# Patient Record
Sex: MALE | Race: WHITE | Employment: OTHER | URBAN - METROPOLITAN AREA
[De-identification: names, ages, dates, MRNs, and addresses within clinical notes are randomized per-mention and may not be internally consistent; named-entity substitution may affect disease eponyms.]

---

## 2024-01-22 ENCOUNTER — INITIAL CONSULT (OUTPATIENT)
Age: 64
End: 2024-01-22

## 2024-01-22 VITALS
DIASTOLIC BLOOD PRESSURE: 75 MMHG | OXYGEN SATURATION: 98 % | SYSTOLIC BLOOD PRESSURE: 131 MMHG | HEART RATE: 52 BPM | BODY MASS INDEX: 28.91 KG/M2 | TEMPERATURE: 98 F | WEIGHT: 173.5 LBS | HEIGHT: 65 IN

## 2024-01-22 DIAGNOSIS — E78.5 HYPERLIPIDEMIA, UNSPECIFIED HYPERLIPIDEMIA TYPE: ICD-10-CM

## 2024-01-22 DIAGNOSIS — I25.10 CAD S/P PERCUTANEOUS CORONARY ANGIOPLASTY: Primary | ICD-10-CM

## 2024-01-22 DIAGNOSIS — I73.9 PVD (PERIPHERAL VASCULAR DISEASE) (HCC): ICD-10-CM

## 2024-01-22 DIAGNOSIS — Z98.61 CAD S/P PERCUTANEOUS CORONARY ANGIOPLASTY: Primary | ICD-10-CM

## 2024-01-22 DIAGNOSIS — Z87.891 FORMER SMOKER: ICD-10-CM

## 2024-01-22 DIAGNOSIS — E11.69 TYPE 2 DIABETES MELLITUS WITH OTHER SPECIFIED COMPLICATION, WITHOUT LONG-TERM CURRENT USE OF INSULIN (HCC): ICD-10-CM

## 2024-01-22 DIAGNOSIS — I10 ESSENTIAL HYPERTENSION: ICD-10-CM

## 2024-01-22 DIAGNOSIS — I25.5 ISCHEMIC CARDIOMYOPATHY: ICD-10-CM

## 2024-01-22 DIAGNOSIS — Z95.1 S/P CABG (CORONARY ARTERY BYPASS GRAFT): ICD-10-CM

## 2024-01-22 DIAGNOSIS — I25.118 CORONARY ARTERY DISEASE OF NATIVE HEART WITH STABLE ANGINA PECTORIS, UNSPECIFIED VESSEL OR LESION TYPE (HCC): ICD-10-CM

## 2024-01-22 PROBLEM — E11.9 TYPE 2 DIABETES MELLITUS (HCC): Status: ACTIVE | Noted: 2024-01-22

## 2024-01-22 PROCEDURE — 3075F SYST BP GE 130 - 139MM HG: CPT | Performed by: PHYSICIAN ASSISTANT

## 2024-01-22 PROCEDURE — 3078F DIAST BP <80 MM HG: CPT | Performed by: THORACIC SURGERY (CARDIOTHORACIC VASCULAR SURGERY)

## 2024-01-22 PROCEDURE — 99215 OFFICE O/P EST HI 40 MIN: CPT | Performed by: PHYSICIAN ASSISTANT

## 2024-01-22 RX ORDER — NITROGLYCERIN 0.4 MG/1
0.4 TABLET SUBLINGUAL EVERY 5 MIN PRN
COMMUNITY

## 2024-01-22 RX ORDER — PRASUGREL 10 MG/1
10 TABLET, FILM COATED ORAL DAILY
COMMUNITY

## 2024-01-22 RX ORDER — ASPIRIN 81 MG/1
81 TABLET ORAL DAILY
COMMUNITY

## 2024-01-22 RX ORDER — EVOLOCUMAB 140 MG/ML
140 INJECTION, SOLUTION SUBCUTANEOUS
COMMUNITY

## 2024-01-22 RX ORDER — BEMPEDOIC ACID 180 MG/1
180 TABLET, FILM COATED ORAL DAILY
COMMUNITY

## 2024-01-22 RX ORDER — LEVOTHYROXINE SODIUM 0.12 MG/1
125 TABLET ORAL DAILY
COMMUNITY

## 2024-01-22 NOTE — PROGRESS NOTES
CSS   History and Physical    Subjective:      Junior Ugarte is a 63 y.o. male who was referred for cardiac evaluation by Dr. Ned Irvin due to MVCAD.    The patient has a past medical history of CAD with CABG x 4 in , he is s/p PCI in  and again in 2023. HTN, HLD, DM, taking Metformin.  Most recently he reports sharp stabbing pain in his chest with radiation to his left arm with no associated SOB, nausea, or vomiting, no dizziness or lightheadedness.  No visual changes.  He is having symptoms daily, they happen with activity, at rest and waking him from sleep.  He additionally has \"popping in his sternum\".      Cardiac Testing    Cardiac catheterization 2023      ECHO: 2024    Left Ventricle: Normal cavity size. There is mild concentric   hypertrophy present. Ejection fraction is 50 - 55%.  Wall motion   abnormalities are suggested in the anterior and anteroseptal myocardium.   Normal left ventricular diastolic function.     Right Ventricle: Normal cavity size and ejection fraction.     There is no evidence of pulmonary artery systolic pressure elevation.     Compared to a prior study, no significant change in LVEF.       Past Medical History:   Diagnosis Date    COPD (chronic obstructive pulmonary disease) (HCC)     Coronary artery disease     Diabetes (HCC)     HTN (hypertension)     Hyperlipidemia     Hypothyroidism     Tobacco use      Past Surgical History:   Procedure Laterality Date    CORONARY ARTERY BYPASS GRAFT      PTCA  2011 and       Social History     Tobacco Use    Smoking status: Former     Current packs/day: 0.00     Types: Cigarettes     Quit date: 10/30/2023     Years since quittin.2    Smokeless tobacco: Not on file   Substance Use Topics    Alcohol use: Not Currently     Comment: Quit 6 years ago      Family History   Problem Relation Age of Onset    Coronary Art Dis Father     Diabetes Father     Coronary Art Dis Brother     COPD

## 2024-01-29 ENCOUNTER — OFFICE VISIT (OUTPATIENT)
Age: 64
End: 2024-01-29

## 2024-01-29 DIAGNOSIS — Z95.1 S/P CABG (CORONARY ARTERY BYPASS GRAFT): ICD-10-CM

## 2024-01-29 DIAGNOSIS — Z98.61 CAD S/P PERCUTANEOUS CORONARY ANGIOPLASTY: Primary | ICD-10-CM

## 2024-01-29 DIAGNOSIS — I25.10 CAD S/P PERCUTANEOUS CORONARY ANGIOPLASTY: Primary | ICD-10-CM

## 2024-01-29 PROCEDURE — 99212 OFFICE O/P EST SF 10 MIN: CPT | Performed by: THORACIC SURGERY (CARDIOTHORACIC VASCULAR SURGERY)

## 2024-01-30 NOTE — PROGRESS NOTES
Came back to the office to discuss his current situation regarding his coronary disease.    I had the opportunity to review his most recent angiogram.    The vein graft to the RCA/PDA was stented but poor flow persisted.    He continues to smoke which may be contributing to progression of his CAD and graft failure.    I do not think that I can offer him anything with regards to repeat surgical revascularization.    I have referred him to Dr. Abraham Jacobson at [facility] for a second opinion.    DT